# Patient Record
Sex: MALE | Race: OTHER | NOT HISPANIC OR LATINO | URBAN - METROPOLITAN AREA
[De-identification: names, ages, dates, MRNs, and addresses within clinical notes are randomized per-mention and may not be internally consistent; named-entity substitution may affect disease eponyms.]

---

## 2018-02-08 ENCOUNTER — EMERGENCY (EMERGENCY)
Facility: HOSPITAL | Age: 23
LOS: 1 days | Discharge: HOME | End: 2018-02-08
Admitting: EMERGENCY MEDICINE
Payer: COMMERCIAL

## 2018-02-08 VITALS
SYSTOLIC BLOOD PRESSURE: 144 MMHG | OXYGEN SATURATION: 98 % | RESPIRATION RATE: 17 BRPM | DIASTOLIC BLOOD PRESSURE: 81 MMHG | TEMPERATURE: 99 F | HEART RATE: 74 BPM | WEIGHT: 160.06 LBS

## 2018-02-08 DIAGNOSIS — B08.1 MOLLUSCUM CONTAGIOSUM: ICD-10-CM

## 2018-02-08 DIAGNOSIS — L98.9 DISORDER OF THE SKIN AND SUBCUTANEOUS TISSUE, UNSPECIFIED: ICD-10-CM

## 2018-02-08 PROCEDURE — 99282 EMERGENCY DEPT VISIT SF MDM: CPT

## 2018-02-08 NOTE — ED ADULT NURSE NOTE - OBJECTIVE STATEMENT
c.o two small papules to back of head for a "few days". denies any fever/chills. evaluated by andreina cardenas. no distress noted.

## 2018-02-08 NOTE — ED PROVIDER NOTE - OBJECTIVE STATEMENT
23 yo m presents to ed c/o of new lesion that his  noticed on his head when he got a haircut today.  Pt describes it as feeling uncomfortable and that he has never had anything like this before.  Pt denies fevers, chills, weakness, fatigue

## 2018-02-08 NOTE — ED PROVIDER NOTE - MEDICAL DECISION MAKING DETAILS
21 yo m presents to ed c/o of new lesion that his  noticed on his head when he got a haircut today.  On exam lesions most likely c/w molloscum contagiosum, needle aspiration to one of lesions performed no discharge, pt has f/u apt with dermatology on tuesday, which he was advised to go to.  Pt stable for discharge

## 2018-02-08 NOTE — ED ADULT NURSE NOTE - NS ED NURSE LEVEL OF CONSCIOUSNESS SPEECH
Alert-The patient is alert, awake and responds to voice. The patient is oriented to time, place, and person. The triage nurse is able to obtain subjective information. Speaking Coherently

## 2018-02-08 NOTE — ED PROVIDER NOTE - SKIN, MLM
Skin normal color for race, warm, dry and intact. No evidence of rash, a few scattered umbilical lesions to occiput of head, no signs of superimposed infection, no surrounding erythema

## 2018-02-08 NOTE — ED ADULT TRIAGE NOTE - CHIEF COMPLAINT QUOTE
"I noticed a bump on the back of my head after I got a haircut today and it hurts when you touch it." No skin breakdown, falls / injury, no neuro deficits.

## 2020-03-16 ENCOUNTER — EMERGENCY (EMERGENCY)
Facility: HOSPITAL | Age: 25
LOS: 1 days | Discharge: HOME | End: 2020-03-16
Admitting: EMERGENCY MEDICINE
Payer: COMMERCIAL

## 2020-03-16 VITALS
WEIGHT: 149.91 LBS | SYSTOLIC BLOOD PRESSURE: 134 MMHG | RESPIRATION RATE: 18 BRPM | TEMPERATURE: 99 F | OXYGEN SATURATION: 100 % | HEIGHT: 67 IN | HEART RATE: 68 BPM | DIASTOLIC BLOOD PRESSURE: 74 MMHG

## 2020-03-16 LAB — S PYO AG SPEC QL IA: NEGATIVE — SIGNIFICANT CHANGE UP

## 2020-03-16 PROCEDURE — 87081 CULTURE SCREEN ONLY: CPT

## 2020-03-16 PROCEDURE — 99282 EMERGENCY DEPT VISIT SF MDM: CPT

## 2020-03-16 PROCEDURE — 99283 EMERGENCY DEPT VISIT LOW MDM: CPT

## 2020-03-16 PROCEDURE — 87880 STREP A ASSAY W/OPTIC: CPT

## 2020-03-16 NOTE — ED PROVIDER NOTE - PATIENT PORTAL LINK FT
You can access the FollowMyHealth Patient Portal offered by [facility] by registering at the following website: [email or web address]. By joining [employer]’s FollowMyHealth portal, you will also be able to view your health information using other applications (apps) compatible with our system.

## 2020-03-16 NOTE — ED ADULT NURSE NOTE - OBJECTIVE STATEMENT
Pt presents complaining of throat pain that worsens with swallowing. Pt reports no cough, no SOB, no fevers/chills.

## 2020-03-16 NOTE — ED PROVIDER NOTE - OBJECTIVE STATEMENT
25 y/o m with no p mh present to ED c/o three days of sore throat and pain with swallowing. Denies fever, ear pain , nasal congestion, rhinorrhea, cough, sob, chest pain, n, v, abd pain or diarrhea.

## 2020-03-16 NOTE — ED PROVIDER NOTE - NSFOLLOWUPINSTRUCTIONS_ED_ALL_ED_FT
[facility]    Viral Respiratory Infection  A respiratory infection is an illness that affects part of the respiratory system, such as the lungs, nose, or throat. A respiratory infection that is caused by a virus is called a viral respiratory infection.  Common types of viral respiratory infections include:  A cold.The flu (influenza).A respiratory syncytial virus (RSV) infection.What are the causes?  This condition is caused by a virus.  What are the signs or symptoms?  Symptoms of this condition include:  A stuffy or runny nose.Yellow or green nasal discharge.A cough.Sneezing.Fatigue.Achy muscles.A sore throat.Sweating or chills.A fever.A headache.How is this diagnosed?  This condition may be diagnosed based on:  Your symptoms.A physical exam.Testing of nasal swabs.How is this treated?  This condition may be treated with medicines, such as:  Antiviral medicine. This may shorten the length of time a person has symptoms.Expectorants. These make it easier to cough up mucus.Decongestant nasal sprays.Acetaminophen or NSAIDs to relieve fever and pain.Antibiotic medicines are not prescribed for viral infections. This is because antibiotics are designed to kill bacteria. They are not effective against viruses.  Follow these instructions at home:     Managing pain and congestion     Take over-the-counter and prescription medicines only as told by your health care provider.If you have a sore throat, gargle with a salt-water mixture 3–4 times a day or as needed. To make a salt-water mixture, completely dissolve ½–1 tsp of salt in 1 cup of warm water.Use nose drops made from salt water to ease congestion and soften raw skin around your nose.Drink enough fluid to keep your urine pale yellow. This helps prevent dehydration and helps loosen up mucus.General instructions     Rest as much as possible.Do not drink alcohol.Do not use any products that contain nicotine or tobacco, such as cigarettes and e-cigarettes. If you need help quitting, ask your health care provider.Keep all follow-up visits as told by your health care provider. This is important.How is this prevented?     Get an annual flu shot. You may get the flu shot in late summer, fall, or winter. Ask your health care provider when you should get your flu shot.Avoid exposing others to your respiratory infection.  Stay home from work or school as told by your health care provider.Wash your hands with soap and water often, especially after you cough or sneeze. If soap and water are not available, use alcohol-based hand .Avoid contact with people who are sick during cold and flu season. This is generally fall and winter.Contact a health care provider if:  Your symptoms last for 10 days or longer.Your symptoms get worse over time.You have a fever.You have severe sinus pain in your face or forehead.The glands in your jaw or neck become very swollen.Get help right away if you:  Feel pain or pressure in your chest.Have shortness of breath.Faint or feel like you will faint.Have severe and persistent vomiting.Feel confused or disoriented.Summary  A respiratory infection is an illness that affects part of the respiratory system, such as the lungs, nose, or throat. A respiratory infection that is caused by a virus is called a viral respiratory infection.Common types of viral respiratory infections are a cold, influenza, and respiratory syncytial virus (RSV) infection.Symptoms of this condition include a stuffy or runny nose, cough, sneezing, fatigue, achy muscles, sore throat, and fevers or chills.Antibiotic medicines are not prescribed for viral infections. This is because antibiotics are designed to kill bacteria. They are not effective against viruses.This information is not intended to replace advice given to you by your health care provider. Make sure you discuss any questions you have with your health care provider.    Document Released: 09/27/2006 Document Revised: 12/26/2019 Document Reviewed: 01/28/2019  [employer] Interactive Patient Education © 2020 [employer] Viral Respiratory Infection  A respiratory infection is an illness that affects part of the respiratory system, such as the lungs, nose, or throat. A respiratory infection that is caused by a virus is called a viral respiratory infection.  Common types of viral respiratory infections include:  A cold.The flu (influenza).A respiratory syncytial virus (RSV) infection.What are the causes?  This condition is caused by a virus.  What are the signs or symptoms?  Symptoms of this condition include:  A stuffy or runny nose.Yellow or green nasal discharge.A cough.Sneezing.Fatigue.Achy muscles.A sore throat.Sweating or chills.A fever.A headache.How is this diagnosed?  This condition may be diagnosed based on:  Your symptoms.A physical exam.Testing of nasal swabs.How is this treated?  This condition may be treated with medicines, such as:  Antiviral medicine. This may shorten the length of time a person has symptoms.Expectorants. These make it easier to cough up mucus.Decongestant nasal sprays.Acetaminophen or NSAIDs to relieve fever and pain.Antibiotic medicines are not prescribed for viral infections. This is because antibiotics are designed to kill bacteria. They are not effective against viruses.  Follow these instructions at home:     Managing pain and congestion     Take over-the-counter and prescription medicines only as told by your health care provider.If you have a sore throat, gargle with a salt-water mixture 3–4 times a day or as needed. To make a salt-water mixture, completely dissolve ½–1 tsp of salt in 1 cup of warm water.Use nose drops made from salt water to ease congestion and soften raw skin around your nose.Drink enough fluid to keep your urine pale yellow. This helps prevent dehydration and helps loosen up mucus.General instructions     Rest as much as possible.Do not drink alcohol.Do not use any products that contain nicotine or tobacco, such as cigarettes and e-cigarettes. If you need help quitting, ask your health care provider.Keep all follow-up visits as told by your health care provider. This is important.How is this prevented?     Get an annual flu shot. You may get the flu shot in late summer, fall, or winter. Ask your health care provider when you should get your flu shot.Avoid exposing others to your respiratory infection.  Stay home from work or school as told by your health care provider.Wash your hands with soap and water often, especially after you cough or sneeze. If soap and water are not available, use alcohol-based hand .Avoid contact with people who are sick during cold and flu season. This is generally fall and winter.Contact a health care provider if:  Your symptoms last for 10 days or longer.Your symptoms get worse over time.You have a fever.You have severe sinus pain in your face or forehead.The glands in your jaw or neck become very swollen.Get help right away if you:  Feel pain or pressure in your chest.Have shortness of breath.Faint or feel like you will faint.Have severe and persistent vomiting.Feel confused or disoriented.Summary  A respiratory infection is an illness that affects part of the respiratory system, such as the lungs, nose, or throat. A respiratory infection that is caused by a virus is called a viral respiratory infection.Common types of viral respiratory infections are a cold, influenza, and respiratory syncytial virus (RSV) infection.Symptoms of this condition include a stuffy or runny nose, cough, sneezing, fatigue, achy muscles, sore throat, and fevers or chills.Antibiotic medicines are not prescribed for viral infections. This is because antibiotics are designed to kill bacteria. They are not effective against viruses.This information is not intended to replace advice given to you by your health care provider. Make sure you discuss any questions you have with your health care provider.    Document Released: 09/27/2006 Document Revised: 12/26/2019 Document Reviewed: 01/28/2019  [employer] Interactive Patient Education © 2020 [employer]

## 2020-03-16 NOTE — ED PROVIDER NOTE - ENMT, MLM
Airway patent, Nasal mucosa clear. Mouth with normal mucosa. Throat has no vesicles, no oropharyngeal exudates and uvula is midline. mid erythema to posterior pharynx ,

## 2020-03-16 NOTE — ED ADULT NURSE NOTE - CHPI ED NUR SYMPTOMS NEG
no numbness/no vomiting/no fever/no nausea/no loss of consciousness/no bleeding gums/no syncope/no weakness/no chills

## 2020-03-20 DIAGNOSIS — B34.9 VIRAL INFECTION, UNSPECIFIED: ICD-10-CM

## 2020-03-20 DIAGNOSIS — R07.0 PAIN IN THROAT: ICD-10-CM

## 2020-10-30 PROBLEM — Z78.9 OTHER SPECIFIED HEALTH STATUS: Chronic | Status: ACTIVE | Noted: 2020-03-16

## 2020-10-30 NOTE — ED PROVIDER NOTE - SKIN, MLM
JENELLE  GROUP DOCUMENTATION INDIVIDUAL Group Therapy Note Date: 10/30/2020 Group Start Time: 1400 Group End Time: 1500 Group Topic: Recreational/Music Therapy [facility] 3 ACUTE BEHAV [facility], [address] GROUP DOCUMENTATION GROUP Group Therapy Note Attendees: 5 Attendance: Did not attend Patient's Goal: Interventions/techniques Skin normal color for race, warm, dry and intact. No evidence of rash.

## 2020-11-19 ENCOUNTER — APPOINTMENT (OUTPATIENT)
Dept: INTERNAL MEDICINE | Facility: CLINIC | Age: 25
End: 2020-11-19
Payer: COMMERCIAL

## 2020-11-19 VITALS
HEART RATE: 88 BPM | TEMPERATURE: 99 F | WEIGHT: 160 LBS | DIASTOLIC BLOOD PRESSURE: 79 MMHG | OXYGEN SATURATION: 98 % | HEIGHT: 67 IN | SYSTOLIC BLOOD PRESSURE: 129 MMHG | BODY MASS INDEX: 25.11 KG/M2

## 2020-11-19 DIAGNOSIS — Z78.9 OTHER SPECIFIED HEALTH STATUS: ICD-10-CM

## 2020-11-19 DIAGNOSIS — Z83.49 FAMILY HISTORY OF OTHER ENDOCRINE, NUTRITIONAL AND METABOLIC DISEASES: ICD-10-CM

## 2020-11-19 DIAGNOSIS — Z82.49 FAMILY HISTORY OF ISCHEMIC HEART DISEASE AND OTHER DISEASES OF THE CIRCULATORY SYSTEM: ICD-10-CM

## 2020-11-19 DIAGNOSIS — Z11.59 ENCOUNTER FOR SCREENING FOR OTHER VIRAL DISEASES: ICD-10-CM

## 2020-11-19 DIAGNOSIS — Z00.00 ENCOUNTER FOR GENERAL ADULT MEDICAL EXAMINATION W/OUT ABNORMAL FINDINGS: ICD-10-CM

## 2020-11-19 DIAGNOSIS — Z23 ENCOUNTER FOR IMMUNIZATION: ICD-10-CM

## 2020-11-19 DIAGNOSIS — Z11.3 ENCOUNTER FOR SCREENING FOR INFECTIONS WITH A PREDOMINANTLY SEXUAL MODE OF TRANSMISSION: ICD-10-CM

## 2020-11-19 PROCEDURE — 36415 COLL VENOUS BLD VENIPUNCTURE: CPT

## 2020-11-19 PROCEDURE — 90686 IIV4 VACC NO PRSV 0.5 ML IM: CPT

## 2020-11-19 PROCEDURE — 99385 PREV VISIT NEW AGE 18-39: CPT | Mod: 25

## 2020-11-19 PROCEDURE — G0008: CPT

## 2020-11-20 ENCOUNTER — TRANSCRIPTION ENCOUNTER (OUTPATIENT)
Age: 25
End: 2020-11-20

## 2020-11-23 ENCOUNTER — TRANSCRIPTION ENCOUNTER (OUTPATIENT)
Age: 25
End: 2020-11-23

## 2020-11-23 LAB
25(OH)D3 SERPL-MCNC: 24.8 NG/ML
ALBUMIN SERPL ELPH-MCNC: 4.9 G/DL
ALP BLD-CCNC: 61 U/L
ALT SERPL-CCNC: 21 U/L
ANION GAP SERPL CALC-SCNC: 10 MMOL/L
APPEARANCE: CLEAR
AST SERPL-CCNC: 28 U/L
BASOPHILS # BLD AUTO: 0.03 K/UL
BASOPHILS NFR BLD AUTO: 0.5 %
BILIRUB SERPL-MCNC: 0.5 MG/DL
BILIRUBIN URINE: NEGATIVE
BLOOD URINE: NEGATIVE
BUN SERPL-MCNC: 16 MG/DL
C TRACH RRNA SPEC QL NAA+PROBE: NOT DETECTED
CALCIUM SERPL-MCNC: 9.4 MG/DL
CHLORIDE SERPL-SCNC: 101 MMOL/L
CHOLEST SERPL-MCNC: 136 MG/DL
CO2 SERPL-SCNC: 27 MMOL/L
COLOR: YELLOW
CREAT SERPL-MCNC: 1 MG/DL
EOSINOPHIL # BLD AUTO: 0.09 K/UL
EOSINOPHIL NFR BLD AUTO: 1.5 %
ESTIMATED AVERAGE GLUCOSE: 85 MG/DL
GLUCOSE QUALITATIVE U: NEGATIVE
GLUCOSE SERPL-MCNC: 88 MG/DL
HBA1C MFR BLD HPLC: 4.6 %
HCT VFR BLD CALC: 44.5 %
HDLC SERPL-MCNC: 54 MG/DL
HGB BLD-MCNC: 14.4 G/DL
HIV1+2 AB SPEC QL IA.RAPID: NONREACTIVE
IMM GRANULOCYTES NFR BLD AUTO: 0.2 %
KETONES URINE: NEGATIVE
LDLC SERPL CALC-MCNC: 68 MG/DL
LEUKOCYTE ESTERASE URINE: NEGATIVE
LYMPHOCYTES # BLD AUTO: 1.83 K/UL
LYMPHOCYTES NFR BLD AUTO: 30.5 %
MAN DIFF?: NORMAL
MCHC RBC-ENTMCNC: 30.6 PG
MCHC RBC-ENTMCNC: 32.4 GM/DL
MCV RBC AUTO: 94.7 FL
MONOCYTES # BLD AUTO: 0.7 K/UL
MONOCYTES NFR BLD AUTO: 11.7 %
N GONORRHOEA RRNA SPEC QL NAA+PROBE: NOT DETECTED
NEUTROPHILS # BLD AUTO: 3.34 K/UL
NEUTROPHILS NFR BLD AUTO: 55.6 %
NITRITE URINE: NEGATIVE
NONHDLC SERPL-MCNC: 82 MG/DL
PH URINE: 6
PLATELET # BLD AUTO: 210 K/UL
POTASSIUM SERPL-SCNC: 4.5 MMOL/L
PROT SERPL-MCNC: 7.3 G/DL
PROTEIN URINE: NEGATIVE
PSA SERPL-MCNC: 0.94 NG/ML
RBC # BLD: 4.7 M/UL
RBC # FLD: 11.4 %
SARS-COV-2 IGG SERPL IA-ACNC: 0.1 INDEX
SARS-COV-2 IGG SERPL QL IA: NEGATIVE
SODIUM SERPL-SCNC: 138 MMOL/L
SOURCE AMPLIFICATION: NORMAL
SPECIFIC GRAVITY URINE: 1.03
T PALLIDUM AB SER QL IA: NEGATIVE
T4 FREE SERPL-MCNC: 1.2 NG/DL
TRIGL SERPL-MCNC: 71 MG/DL
TSH SERPL-ACNC: 1.28 UIU/ML
UROBILINOGEN URINE: NORMAL
WBC # FLD AUTO: 6 K/UL

## 2020-11-23 NOTE — HISTORY OF PRESENT ILLNESS
[FreeTextEntry1] : annual exam/est care [de-identified] : annual\par - in overall good health w/o specific complaints

## 2021-11-20 ENCOUNTER — EMERGENCY (EMERGENCY)
Facility: HOSPITAL | Age: 26
LOS: 1 days | Discharge: HOME | End: 2021-11-20
Attending: EMERGENCY MEDICINE | Admitting: EMERGENCY MEDICINE
Payer: COMMERCIAL

## 2021-11-20 VITALS
DIASTOLIC BLOOD PRESSURE: 84 MMHG | HEART RATE: 83 BPM | HEIGHT: 67 IN | TEMPERATURE: 98 F | SYSTOLIC BLOOD PRESSURE: 144 MMHG | RESPIRATION RATE: 16 BRPM | OXYGEN SATURATION: 100 % | WEIGHT: 153 LBS
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
## 2021-11-20 DIAGNOSIS — M79.662 PAIN IN LEFT LOWER LEG: ICD-10-CM

## 2021-11-20 DIAGNOSIS — S80.812A ABRASION, LEFT LOWER LEG, INITIAL ENCOUNTER: ICD-10-CM
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
## 2021-11-20 PROCEDURE — 73590 X-RAY EXAM OF LOWER LEG: CPT

## 2021-11-20 PROCEDURE — 99283 EMERGENCY DEPT VISIT LOW MDM: CPT | Mod: 25

## 2021-11-20 PROCEDURE — 73590 X-RAY EXAM OF LOWER LEG: CPT | Mod: 26

## 2021-11-20 PROCEDURE — 73590 X-RAY EXAM OF LOWER LEG: CPT | Mod: 26,LT

## 2021-11-20 NOTE — ED ADULT TRIAGE NOTE - CHIEF COMPLAINT QUOTE
Pt co L shin pain after injury at work- luggage hit int shin. Pt has small abrasion and erythema to shin. Ambulatory with steady gait. tetanus is UTD.

## 2021-11-20 NOTE — ED PROVIDER NOTE - CARE PLAN
Principal Discharge DX:	Pain in shin, left   1 Principal Discharge DX:	Pain in shin, left  Secondary Diagnosis:	Abrasion

## 2021-11-20 NOTE — ED PROVIDER NOTE - OBJECTIVE STATEMENT
26M with no sig PMHx who p/w left shin pain after a piece of heavy luggage hit his shin while he was at work PTA, he has a small subcm abrasion to shin that he cleaned out and covered with a band aid, no active bleeding, tdap UTD. pt denies other trauma or injury. he has been ambulatory. no other complaints.

## 2021-11-20 NOTE — ED PROVIDER NOTE - PHYSICAL EXAMINATION
GEN: Well appearing, well developed, awake, alert, oriented to person, place, time/situation and in no apparent distress. NTAF  ENT: Airway patent, Nasal mucosa clear. Mouth with normal mucosa.  EYES: Clear bilaterally. PERRL, EOMI  RESPIRATORY: Breathing comfortably with normal RR.  CARDIAC: Regular rate and rhythm, no M/R/G  ABDOMEN: Soft, nontender, +bowel sounds, no rebound, rigidity, or guarding.  MSK: Range of motion is not limited, no deformities noted. +TTP to left shin  NEURO: Alert and oriented, no focal deficits.  SKIN: Skin normal color for race, warm, dry, subcm abrasion to left shin, no active bleeding. No evidence of rash.  PSYCH: Alert and oriented to person, place, time/situation. normal mood and affect. no apparent risk to self or others.

## 2021-11-20 NOTE — ED PROVIDER NOTE - NSFOLLOWUPINSTRUCTIONS_ED_ALL_ED_FT
Please follow up with your primary care physician. You may call our referrals coordinator at [phone] Monday to Friday 11am-7pm for assistance with making an appointment.  Return to the Emergency Department if you have any new or worsening symptoms, or for any other concerns. Please read below for further information.      Abrasion      An abrasion is a cut or a scrape on your skin. You must take care of your wound so germs do not get in it and cause infection.      What are the causes?    This condition is caused by rubbing your skin on something or falling on a surface, such as the ground. When your skin rubs on something, some layers of skin may rub off.      What are the signs or symptoms?    •A cut or a scrape.       •Bleeding.      •A red or pink spot.      •A bruise under your wound.        How is this treated?  This condition may be treated with:  •Cleaning your wound.      •Putting ointment on your wound.      •Putting a bandage on your wound.      •Getting a tetanus shot.        Follow these instructions at home:    Your doctor may tell you to do these things:    Medicines     •Take or use over-the-counter and prescription medicines only as told by your doctor.      •If you were prescribed an antibiotic medicine, use it as told by your doctor. Do not stop using it even if you start to feel better.      Keep your wound clean   •Clean your wound 1 or 2 times a day or as often told by your doctor. To do this:  1.Wash your hands for at least 20 seconds with mild soap and water. Do this before and after you clean your wound.      2.Wash your wound with mild soap and water.      3.Rinse off the soap.      4.Pat your wound with a clean towel to dry it. Do not rub your wound.      •Keep your bandage clean and dry. Take it off and change it as told by your doctor.  •You may have to change your bandage one or more times a day, or as told by your doctor.        Watch for signs of infection   Check your wound every day for signs of infection. Check for:  •A red streak that goes away from your wound.      •Other redness.      •Swelling or more pain.      •Warmth.       •Blood, fluid, pus, or a bad smell.        Treat pain and swelling  •If told, put ice on the injured area. To do this:  •Put ice in a plastic bag.       •Place a towel between your skin and the bag.       •Leave the ice on for 20 minutes, 2–3 times a day.      •Take off the ice if your skin turns bright red. This is very important. If you cannot feel pain, heat, or cold, you have a greater risk of damage to the area.        •If you can, raise the injured area above the level of your heart while you are sitting or lying down.      General instructions    •Do not take baths, swim, or use a hot tub. Ask your doctor about taking showers or sponge baths.      •Keep all follow-up visits.        Contact a doctor if:  •You had a tetanus shot, and you have any of these where the needle went in:  •Swelling.      •Very bad pain.      •Redness.      •Bleeding.        •You have a lot of pain, and medicine does not help.      •You have a fever.    •You have any of these signs of infection in your wound:  •Redness, swelling, or more pain.      •Blood, fluid, pus, or a bad smell.      •Warmth.          Get help right away if:    •You have a red streak going away from your wound.        Summary    •An abrasion is a cut or a scrape on your skin. Take care of your wound so germs do not get in it.      •Clean your wound 1 or 2 times a day or as often as told. Change your bandage as told and use medicines as told.      •Call your doctor if you have a fever or if you have redness, swelling, or more pain in your wound.      •Call your doctor if you have blood, fluid, pus, or a bad smell in your wound.      •Get help right away if you have a red streak going away from your wound.      This information is not intended to replace advice given to you by your health care provider. Make sure you discuss any questions you have with your health care provider.

## 2021-11-20 NOTE — ED PROVIDER NOTE - PATIENT PORTAL LINK FT
You can access the FollowMyHealth Patient Portal offered by [facility] by registering at the following website: [email or web address]. By joining [employer]’s FollowMyHealth portal, you will also be able to view your health information using other applications (apps) compatible with our system.

## 2021-11-20 NOTE — ED PROVIDER NOTE - CLINICAL SUMMARY MEDICAL DECISION MAKING FREE TEXT BOX
26M with no sig PMHx who p/w left shin pain after a piece of heavy luggage hit his shin while he was at work PTA, he has a small subcm abrasion to shin that he cleaned out and covered with a band aid, no active bleeding, tdap UTD. pt denies other trauma or injury. he has been ambulatory. no other complaints.   Pt is well-appearing on exam, VSS, no focal neuro deficits, Distal ext NVI with +2 pulses, compartments soft, no ligamentous instability, small abrasion to left shin, no active bleeding.  Plan for Xray to r/o fracture, pain control prn, wound care instructions given.

## 2022-01-06 ENCOUNTER — EMERGENCY (EMERGENCY)
Facility: HOSPITAL | Age: 27
LOS: 1 days | Discharge: HOME | End: 2022-01-06
Admitting: EMERGENCY MEDICINE
Payer: COMMERCIAL

## 2022-01-06 VITALS
DIASTOLIC BLOOD PRESSURE: 72 MMHG | HEIGHT: 67 IN | TEMPERATURE: 98 F | RESPIRATION RATE: 18 BRPM | HEART RATE: 75 BPM | WEIGHT: 149.91 LBS | SYSTOLIC BLOOD PRESSURE: 122 MMHG | OXYGEN SATURATION: 100 %

## 2022-01-06 VITALS
RESPIRATION RATE: 19 BRPM | HEART RATE: 71 BPM | OXYGEN SATURATION: 100 % | TEMPERATURE: 98 F | SYSTOLIC BLOOD PRESSURE: 118 MMHG | DIASTOLIC BLOOD PRESSURE: 70 MMHG

## 2022-01-06 DIAGNOSIS — S90.812A ABRASION, LEFT FOOT, INITIAL ENCOUNTER: ICD-10-CM

## 2022-01-06 DIAGNOSIS — Z23 ENCOUNTER FOR IMMUNIZATION: ICD-10-CM
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
## 2022-01-06 DIAGNOSIS — S91.332A PUNCTURE WOUND WITHOUT FOREIGN BODY, LEFT FOOT, INITIAL ENCOUNTER: ICD-10-CM
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
## 2022-01-06 PROCEDURE — 99283 EMERGENCY DEPT VISIT LOW MDM: CPT | Mod: 25

## 2022-01-06 PROCEDURE — 90471 IMMUNIZATION ADMIN: CPT

## 2022-01-06 PROCEDURE — 99283 EMERGENCY DEPT VISIT LOW MDM: CPT

## 2022-01-06 PROCEDURE — 90715 TDAP VACCINE 7 YRS/> IM: CPT

## 2022-01-06 RX ORDER — CEPHALEXIN 500 MG
1 CAPSULE ORAL
Qty: 28 | Refills: 0
Start: 2022-01-06 | End: 2022-01-12

## 2022-01-06 RX ORDER — TETANUS TOXOID, REDUCED DIPHTHERIA TOXOID AND ACELLULAR PERTUSSIS VACCINE, ADSORBED 5; 2.5; 8; 8; 2.5 [IU]/.5ML; [IU]/.5ML; UG/.5ML; UG/.5ML; UG/.5ML
0.5 SUSPENSION INTRAMUSCULAR ONCE
Refills: 0 | Status: COMPLETED | OUTPATIENT
Start: 2022-01-06 | End: 2022-01-06

## 2022-01-06 RX ADMIN — TETANUS TOXOID, REDUCED DIPHTHERIA TOXOID AND ACELLULAR PERTUSSIS VACCINE, ADSORBED 0.5 MILLILITER(S): 5; 2.5; 8; 8; 2.5 SUSPENSION INTRAMUSCULAR at 07:36

## 2022-01-06 NOTE — ED ADULT TRIAGE NOTE - CHIEF COMPLAINT QUOTE
punctured wound left foot, pt accidentally stepped on a anusha nail @ an hour ago punctured wound left foot, pt accidentally stepped on a anusha nail @ an hour ago, last tetanus shots more than 10 years.

## 2022-01-06 NOTE — ED ADULT NURSE NOTE - CHIEF COMPLAINT QUOTE
punctured wound left foot, pt accidentally stepped on a anusha nail @ an hour ago, last tetanus shots more than 10 years.

## 2022-01-06 NOTE — ED ADULT NURSE NOTE - NSIMPLEMENTINTERV_GEN_ALL_ED
Implemented All Universal Safety Interventions:  [place] to call system. Call bell, personal items and telephone within reach. Instruct patient to call for assistance. Room bathroom lighting operational. Non-slip footwear when patient is off stretcher. Physically safe environment: no spills, clutter or unnecessary equipment. Stretcher in lowest position, wheels locked, appropriate side rails in place.

## 2022-01-06 NOTE — ED PROVIDER NOTE - PATIENT PORTAL LINK FT
You can access the FollowMyHealth Patient Portal offered by [facility] by registering at the following website: [email or web address]. By joining [facility]’s FollowMyHealth portal, you will also be able to view your health information using other applications (apps) compatible with our system.

## 2022-01-06 NOTE — ED PROVIDER NOTE - SKIN, MLM
[facility]  Dr. Benson  [phone]    WOUND CARE INSTRUCTIONS      Begin in 48 hours    1. Cleanse wound gently with tap water and Q-tip once daily.  If there is drainage, remove as much as possible.   2. Apply Vaseline-do not let the wound dry out.  Reapply Vaseline as needed.    3.  Cover the wound with a clean Band-Aid or non-stick dressing, unless instructed differently.    4.  If there is any oozing or bleeding, apply firm pressure for 5 minutes or more.      You may shower, however reapply vaseline and bandaid.          Please call with any questions.                   Skin normal color for race, warm, dry and intact. No evidence of rash.

## 2022-01-06 NOTE — ED PROVIDER NOTE - CLINICAL SUMMARY MEDICAL DECISION MAKING FREE TEXT BOX
pt sustained a superficial abrasion to L foot - cut on a nail at home while barefoot, last Td? no fb/no tend, no lac to suture, td updated, wound cleaned and dressed, will dc w/abx, wound care discussed, pt understands and agrees w/plan, strict return precautions given.

## 2022-01-06 NOTE — ED ADULT NURSE NOTE - OBJECTIVE STATEMENT
26 y M, states he was walking and felt pain in his right foot, pt states he pulled 1 anusha nail out of his foot, foot at this time non bloody, no drainage, able to ambulate and walk with mild difficulty but able to bear full weight. Pt states he does not remember when last tetanus shot was but believes over 10 years ago.

## 2022-01-06 NOTE — ED PROVIDER NOTE - OBJECTIVE STATEMENT
The pt is a 27 y/o M, who presents to ED c/o cut to bottom of foot - cut on a nail at home (was barefoot). Pt states some bleeding, last Td? Denies numbness or tingling to toes, decreased ROM, FB sensation, any other c/o.

## 2022-01-06 NOTE — ED PROVIDER NOTE - NSFOLLOWUPINSTRUCTIONS_ED_ALL_ED_FT
KEEP CLEAN AND DRY, WASH WITH WATER AND SOAP, APPLY BACITRACIN AND COVER WITH DRESSING, TAKE ANTIBIOTICS, TYLENOL OR IBUPROFEN AS NEEDED FOR PAIN, ELEVATE FOOT IF THROBS, RETURN FOR REDNESS, PUS, BLEEDING, PAIN.   Abrasion  WHAT YOU NEED TO KNOW:  An abrasion is a wound on your skin. Abrasions usually happen when your skin rubs against a rough surface. Examples of an abrasion include rug burn, a skinned elbow, or road rash. Abrasions can be deep or shallow The wound may hurt, bleed, bruise, or swell.  DISCHARGE INSTRUCTIONS:  Return to the emergency department if:   •The bleeding does not stop after 10 minutes of firm pressure.  •The redness around your wound begins to spread.  •You cannot rinse one or more foreign objects out of your wound.  Call your doctor if:   •You have a fever or chills.   •Your abrasion is red, warm, swollen, or draining pus.  •You have questions or concerns about your condition or care.  Care for your abrasion:   •Wash your hands and dry them with a clean towel before first.  •Press a clean cloth against your wound for 5 to 10 minutes to stop any bleeding.  •Rinse your wound with clean water. Do not use harsh soap, alcohol, or iodine solutions.  •Use a clean, wet cloth to remove any objects, such as small pieces of rocks or dirt.  •Rub antibiotic ointment on your wound. This may help prevent infection and help your wound heal.  •Cover the wound with a non-stick bandage. Change the bandage daily, and if it gets wet or dirty.

## 2022-01-06 NOTE — ED PROVIDER NOTE - MUSCULOSKELETAL, MLM
Spine appears normal, range of motion is not limited, no muscle or joint tenderness; L foot: a very superficial, flap abrasion, ~2mm to plantar aspect of 5th metatarsal, no active bleeding, no FB, no swelling, no pus, FROM, no bony tend, + light touch

## 2022-04-26 NOTE — ED ADULT NURSE NOTE - TEMPLATE
General Propranolol Pregnancy And Lactation Text: This medication is Pregnancy Category C and it isn't known if it is safe during pregnancy. It is excreted in breast milk.

## 2023-09-24 ENCOUNTER — TRANSCRIPTION ENCOUNTER (OUTPATIENT)
Age: 28
End: 2023-09-24

## 2023-09-25 ENCOUNTER — INPATIENT (INPATIENT)
Facility: HOSPITAL | Age: 28
LOS: 0 days | Discharge: HOME | DRG: 343 | End: 2023-09-26
Attending: STUDENT IN AN ORGANIZED HEALTH CARE EDUCATION/TRAINING PROGRAM | Admitting: STUDENT IN AN ORGANIZED HEALTH CARE EDUCATION/TRAINING PROGRAM
Payer: MEDICAID

## 2023-09-25 ENCOUNTER — RESULT REVIEW (OUTPATIENT)
Age: 28
End: 2023-09-25

## 2023-09-25 ENCOUNTER — TRANSCRIPTION ENCOUNTER (OUTPATIENT)
Age: 28
End: 2023-09-25

## 2023-09-25 VITALS
HEART RATE: 86 BPM | WEIGHT: 154.98 LBS | DIASTOLIC BLOOD PRESSURE: 76 MMHG | HEIGHT: 67 IN | SYSTOLIC BLOOD PRESSURE: 146 MMHG | TEMPERATURE: 100 F | RESPIRATION RATE: 16 BRPM | OXYGEN SATURATION: 96 %

## 2023-09-25 LAB
ALBUMIN SERPL ELPH-MCNC: 4.6 G/DL — SIGNIFICANT CHANGE UP (ref 3.3–5)
ALP SERPL-CCNC: 60 U/L — SIGNIFICANT CHANGE UP (ref 40–120)
ALT FLD-CCNC: 14 U/L — SIGNIFICANT CHANGE UP (ref 10–45)
ANION GAP SERPL CALC-SCNC: 8 MMOL/L — SIGNIFICANT CHANGE UP (ref 5–17)
APPEARANCE UR: CLEAR — SIGNIFICANT CHANGE UP
APTT BLD: 30.4 SEC — SIGNIFICANT CHANGE UP (ref 24.5–35.6)
AST SERPL-CCNC: 19 U/L — SIGNIFICANT CHANGE UP (ref 10–40)
BASOPHILS # BLD AUTO: 0.04 K/UL — SIGNIFICANT CHANGE UP (ref 0–0.2)
BASOPHILS NFR BLD AUTO: 0.4 % — SIGNIFICANT CHANGE UP (ref 0–2)
BILIRUB SERPL-MCNC: 0.8 MG/DL — SIGNIFICANT CHANGE UP (ref 0.2–1.2)
BILIRUB UR-MCNC: NEGATIVE — SIGNIFICANT CHANGE UP
BUN SERPL-MCNC: 11 MG/DL — SIGNIFICANT CHANGE UP (ref 7–23)
CALCIUM SERPL-MCNC: 9.1 MG/DL — SIGNIFICANT CHANGE UP (ref 8.4–10.5)
CHLORIDE SERPL-SCNC: 100 MMOL/L — SIGNIFICANT CHANGE UP (ref 96–108)
CO2 SERPL-SCNC: 30 MMOL/L — SIGNIFICANT CHANGE UP (ref 22–31)
COLOR SPEC: YELLOW — SIGNIFICANT CHANGE UP
CREAT SERPL-MCNC: 1.04 MG/DL — SIGNIFICANT CHANGE UP (ref 0.5–1.3)
DIFF PNL FLD: NEGATIVE — SIGNIFICANT CHANGE UP
EGFR: 101 ML/MIN/1.73M2 — SIGNIFICANT CHANGE UP
EOSINOPHIL # BLD AUTO: 0.05 K/UL — SIGNIFICANT CHANGE UP (ref 0–0.5)
EOSINOPHIL NFR BLD AUTO: 0.5 % — SIGNIFICANT CHANGE UP (ref 0–6)
GLUCOSE SERPL-MCNC: 98 MG/DL — SIGNIFICANT CHANGE UP (ref 70–99)
GLUCOSE UR QL: NEGATIVE — SIGNIFICANT CHANGE UP
HCT VFR BLD CALC: 45 % — SIGNIFICANT CHANGE UP (ref 39–50)
HGB BLD-MCNC: 15.1 G/DL — SIGNIFICANT CHANGE UP (ref 13–17)
IMM GRANULOCYTES NFR BLD AUTO: 0.2 % — SIGNIFICANT CHANGE UP (ref 0–0.9)
INR BLD: 1.34 — HIGH (ref 0.85–1.18)
KETONES UR-MCNC: NEGATIVE — SIGNIFICANT CHANGE UP
LEUKOCYTE ESTERASE UR-ACNC: NEGATIVE — SIGNIFICANT CHANGE UP
LIDOCAIN IGE QN: 26 U/L — SIGNIFICANT CHANGE UP (ref 7–60)
LYMPHOCYTES # BLD AUTO: 1.9 K/UL — SIGNIFICANT CHANGE UP (ref 1–3.3)
LYMPHOCYTES # BLD AUTO: 17.8 % — SIGNIFICANT CHANGE UP (ref 13–44)
MCHC RBC-ENTMCNC: 30.7 PG — SIGNIFICANT CHANGE UP (ref 27–34)
MCHC RBC-ENTMCNC: 33.6 GM/DL — SIGNIFICANT CHANGE UP (ref 32–36)
MCV RBC AUTO: 91.5 FL — SIGNIFICANT CHANGE UP (ref 80–100)
MONOCYTES # BLD AUTO: 1.05 K/UL — HIGH (ref 0–0.9)
MONOCYTES NFR BLD AUTO: 9.9 % — SIGNIFICANT CHANGE UP (ref 2–14)
NEUTROPHILS # BLD AUTO: 7.59 K/UL — HIGH (ref 1.8–7.4)
NEUTROPHILS NFR BLD AUTO: 71.2 % — SIGNIFICANT CHANGE UP (ref 43–77)
NITRITE UR-MCNC: NEGATIVE — SIGNIFICANT CHANGE UP
NRBC # BLD: 0 /100 WBCS — SIGNIFICANT CHANGE UP (ref 0–0)
PH UR: 6 — SIGNIFICANT CHANGE UP (ref 5–8)
PLATELET # BLD AUTO: 192 K/UL — SIGNIFICANT CHANGE UP (ref 150–400)
POTASSIUM SERPL-MCNC: 4.5 MMOL/L — SIGNIFICANT CHANGE UP (ref 3.5–5.3)
POTASSIUM SERPL-SCNC: 4.5 MMOL/L — SIGNIFICANT CHANGE UP (ref 3.5–5.3)
PROT SERPL-MCNC: 7.6 G/DL — SIGNIFICANT CHANGE UP (ref 6–8.3)
PROT UR-MCNC: NEGATIVE MG/DL — SIGNIFICANT CHANGE UP
PROTHROM AB SERPL-ACNC: 15.1 SEC — HIGH (ref 9.5–13)
RBC # BLD: 4.92 M/UL — SIGNIFICANT CHANGE UP (ref 4.2–5.8)
RBC # FLD: 11.9 % — SIGNIFICANT CHANGE UP (ref 10.3–14.5)
SODIUM SERPL-SCNC: 138 MMOL/L — SIGNIFICANT CHANGE UP (ref 135–145)
SP GR SPEC: 1.01 — SIGNIFICANT CHANGE UP (ref 1–1.03)
UROBILINOGEN FLD QL: 0.2 E.U./DL — SIGNIFICANT CHANGE UP
WBC # BLD: 10.65 K/UL — HIGH (ref 3.8–10.5)
WBC # FLD AUTO: 10.65 K/UL — HIGH (ref 3.8–10.5)

## 2023-09-25 PROCEDURE — 88304 TISSUE EXAM BY PATHOLOGIST: CPT | Mod: 26

## 2023-09-25 PROCEDURE — 74177 CT ABD & PELVIS W/CONTRAST: CPT | Mod: 26,MG

## 2023-09-25 PROCEDURE — 44970 LAPAROSCOPY APPENDECTOMY: CPT

## 2023-09-25 PROCEDURE — G1004: CPT

## 2023-09-25 PROCEDURE — 99285 EMERGENCY DEPT VISIT HI MDM: CPT

## 2023-09-25 PROCEDURE — 99223 1ST HOSP IP/OBS HIGH 75: CPT | Mod: 57

## 2023-09-25 DEVICE — CLIP APPLIER ETHICON LIGAMAX 5MM: Type: IMPLANTABLE DEVICE | Status: FUNCTIONAL

## 2023-09-25 DEVICE — STAPLER COVIDIEN TRI-STAPLE 45MM PURPLE RELOAD: Type: IMPLANTABLE DEVICE | Status: FUNCTIONAL

## 2023-09-25 RX ORDER — HEPARIN SODIUM 5000 [USP'U]/ML
5000 INJECTION INTRAVENOUS; SUBCUTANEOUS EVERY 8 HOURS
Refills: 0 | Status: DISCONTINUED | OUTPATIENT
Start: 2023-09-25 | End: 2023-09-26

## 2023-09-25 RX ORDER — IOHEXOL 300 MG/ML
30 INJECTION, SOLUTION INTRAVENOUS ONCE
Refills: 0 | Status: COMPLETED | OUTPATIENT
Start: 2023-09-25 | End: 2023-09-25

## 2023-09-25 RX ORDER — SODIUM CHLORIDE 9 MG/ML
1000 INJECTION INTRAMUSCULAR; INTRAVENOUS; SUBCUTANEOUS ONCE
Refills: 0 | Status: COMPLETED | OUTPATIENT
Start: 2023-09-25 | End: 2023-09-25

## 2023-09-25 RX ORDER — HYDROMORPHONE HYDROCHLORIDE 2 MG/ML
0.5 INJECTION INTRAMUSCULAR; INTRAVENOUS; SUBCUTANEOUS EVERY 6 HOURS
Refills: 0 | Status: DISCONTINUED | OUTPATIENT
Start: 2023-09-25 | End: 2023-09-26

## 2023-09-25 RX ORDER — METRONIDAZOLE 500 MG
500 TABLET ORAL EVERY 8 HOURS
Refills: 0 | Status: DISCONTINUED | OUTPATIENT
Start: 2023-09-26 | End: 2023-09-25

## 2023-09-25 RX ORDER — OXYCODONE HYDROCHLORIDE 5 MG/1
5 TABLET ORAL EVERY 6 HOURS
Refills: 0 | Status: DISCONTINUED | OUTPATIENT
Start: 2023-09-25 | End: 2023-09-26

## 2023-09-25 RX ORDER — ONDANSETRON 8 MG/1
4 TABLET, FILM COATED ORAL EVERY 6 HOURS
Refills: 0 | Status: DISCONTINUED | OUTPATIENT
Start: 2023-09-25 | End: 2023-09-26

## 2023-09-25 RX ORDER — CEFTRIAXONE 500 MG/1
1000 INJECTION, POWDER, FOR SOLUTION INTRAMUSCULAR; INTRAVENOUS ONCE
Refills: 0 | Status: COMPLETED | OUTPATIENT
Start: 2023-09-25 | End: 2023-09-25

## 2023-09-25 RX ORDER — SODIUM CHLORIDE 9 MG/ML
1000 INJECTION, SOLUTION INTRAVENOUS
Refills: 0 | Status: DISCONTINUED | OUTPATIENT
Start: 2023-09-25 | End: 2023-09-25

## 2023-09-25 RX ORDER — ACETAMINOPHEN 500 MG
650 TABLET ORAL EVERY 6 HOURS
Refills: 0 | Status: DISCONTINUED | OUTPATIENT
Start: 2023-09-25 | End: 2023-09-26

## 2023-09-25 RX ORDER — HYDROMORPHONE HYDROCHLORIDE 2 MG/ML
0.5 INJECTION INTRAMUSCULAR; INTRAVENOUS; SUBCUTANEOUS EVERY 4 HOURS
Refills: 0 | Status: DISCONTINUED | OUTPATIENT
Start: 2023-09-25 | End: 2023-09-25

## 2023-09-25 RX ORDER — METRONIDAZOLE 500 MG
500 TABLET ORAL ONCE
Refills: 0 | Status: COMPLETED | OUTPATIENT
Start: 2023-09-25 | End: 2023-09-25

## 2023-09-25 RX ORDER — CEFTRIAXONE 500 MG/1
1000 INJECTION, POWDER, FOR SOLUTION INTRAMUSCULAR; INTRAVENOUS EVERY 24 HOURS
Refills: 0 | Status: DISCONTINUED | OUTPATIENT
Start: 2023-09-26 | End: 2023-09-25

## 2023-09-25 RX ADMIN — SODIUM CHLORIDE 1000 MILLILITER(S): 9 INJECTION INTRAMUSCULAR; INTRAVENOUS; SUBCUTANEOUS at 15:24

## 2023-09-25 RX ADMIN — Medication 650 MILLIGRAM(S): at 23:42

## 2023-09-25 RX ADMIN — CEFTRIAXONE 100 MILLIGRAM(S): 500 INJECTION, POWDER, FOR SOLUTION INTRAMUSCULAR; INTRAVENOUS at 17:24

## 2023-09-25 RX ADMIN — Medication 100 MILLIGRAM(S): at 17:43

## 2023-09-25 RX ADMIN — IOHEXOL 30 MILLILITER(S): 300 INJECTION, SOLUTION INTRAVENOUS at 15:24

## 2023-09-25 RX ADMIN — HEPARIN SODIUM 5000 UNIT(S): 5000 INJECTION INTRAVENOUS; SUBCUTANEOUS at 22:42

## 2023-09-25 RX ADMIN — Medication 650 MILLIGRAM(S): at 22:42

## 2023-09-25 NOTE — ED PROVIDER NOTE - CLINICAL SUMMARY MEDICAL DECISION MAKING FREE TEXT BOX
26 y/o m presents c/o RLQ abd pain x 1 day.  Oral temp in , other vitals unremarkable, pt with RLQ tenderness on exam, declined pain meds.  Labs with wbc 10.8, CT a/p shows acute appendicitis.  Pt given ceftriaxone and flagyl in ED, consulted surgery who will admit, plan for OR

## 2023-09-25 NOTE — H&P ADULT - NSHPLABSRESULTS_GEN_ALL_CORE
ABS:                        15.1   10.65 )-----------( 192      ( 25 Sep 2023 15:30 )             45.0     09-25    138  |  100  |  11  ----------------------------<  98  4.5   |  30  |  1.04    Ca    9.1      25 Sep 2023 15:30    TPro  7.6  /  Alb  4.6  /  TBili  0.8  /  DBili  x   /  AST  19  /  ALT  14  /  AlkPhos  60  09-25    PT/INR - ( 25 Sep 2023 15:30 )   PT: 15.1 sec;   INR: 1.34          PTT - ( 25 Sep 2023 15:30 )  PTT:30.4 sec

## 2023-09-25 NOTE — PATIENT PROFILE ADULT - FUNCTIONAL ASSESSMENT - DAILY ACTIVITY 5.
Pt has 5 steps c B rails, far apart, to get into house and one flight of stairs, c R rail to go up, to negotiate at home. Pt has a walker and a cane at home but didn't like to use them prior to admission. Pt has a 24/7 home health aide, private paid, for his wife.
3 = A little assistance

## 2023-09-25 NOTE — ED PROVIDER NOTE - OBJECTIVE STATEMENT
28 y/o m no pmh presents c/o RLQ abd pain since last night.  Pt reports nausea, no vomiting, felt chills last night.  Pt denies urinary sx, diarrhea, all other ROS negative.

## 2023-09-25 NOTE — ED PROVIDER NOTE - NS ED ATTENDING STATEMENT MOD
This was a shared visit with the BETTINA. I reviewed and verified the documentation and independently performed the documented:

## 2023-09-25 NOTE — H&P ADULT - HISTORY OF PRESENT ILLNESS
28yo Male pt with no PMH, no surgical history, presenting with sharp RLQ pain that started soddenly last night, associated to subjective fevers, chills and nausea, no vomiting.     In the ED, pt afebrile, nontachycardic, normotensive, and satting on RA. On exam, tender in RLQ. Labs significant for WBC 10.56 with left shift . CTAP showing appendicitis with appendicolith.     PMH: denies  PSHx: denies  Medications:None  Allergies: NKDA  Social Hx: Denies smoking, alcohol, drugs   Family Hx: Denies family hx of IBS, Crohn's, UC, or colon cancer.

## 2023-09-25 NOTE — ED PROVIDER NOTE - ATTENDING APP SHARED VISIT CONTRIBUTION OF CARE
Presenting with RLQ ttp- found to have acute appendicitis. Given IV antibx. Admit to general surgery.

## 2023-09-25 NOTE — PATIENT PROFILE ADULT - SURGICAL SITE DESCRIPTION
3 dermabonds on abdomen intact, clean and dry, no hematoma, no inflammation, no drainage, no bleeding

## 2023-09-25 NOTE — ED ADULT NURSE NOTE - CHIEF COMPLAINT QUOTE
Pt presents to ED here for RLQ pain over 223 hours w/ nausea. Denies sob, cp at present. No known PMHx. Neuro intact and ambulatory.

## 2023-09-25 NOTE — H&P ADULT - NSHPPHYSICALEXAM_GEN_ALL_CORE
T(C): 37.8 (09-25-23 @ 14:39), Max: 37.8 (09-25-23 @ 14:39)  HR: 86 (09-25-23 @ 14:39) (86 - 86)  BP: 146/76 (09-25-23 @ 14:39) (146/76 - 146/76)  RR: 16 (09-25-23 @ 14:39) (16 - 16)  SpO2: 96% (09-25-23 @ 14:39) (96% - 96%)    Physical Exam  General: AAOx3, NAD, laying comfortably in bed  Cardio: S1,S2, No MRG  Pulm: Nonlabored breathing  Abdomen: soft, tender in RLQ, voluntary guarding   Extremities: WWP, peripheral pulses appreciated

## 2023-09-25 NOTE — H&P ADULT - ATTENDING COMMENTS
Patient is a 27 year old gentleman who presents with clinical, laboratory, and radiographic findings consistent with acute appendicitis. He has one day history of sharp and sudden on set right lower quadrant pain with nausea. At time of evaluation afebrile, hemodynamically stable, abdomen soft, tender to palpation in RLQ, nondistended, no rebound or guarding. No previous abdominal surgeries. WBC 11, CT imaging demonstrates significantly thickened and dilated appendix 17 mm with appendicolith. Plan for bowel rest, IVF, IV antibiotics, OR for laparoscopic appendectomy for acute appendicitis.

## 2023-09-25 NOTE — ED ADULT TRIAGE NOTE - CHIEF COMPLAINT QUOTE
Pt presents to ED here for RLQ pain over 223 hours w/ nausea. Denies sob, cp at present. No known PMHx. Neuro intact and ambulatory. Pt presents to ED here for RLQ pain over 22 hours w/ nausea. Denies sob, cp at present. No known PMHx. Neuro intact and ambulatory.

## 2023-09-25 NOTE — PRE-ANESTHESIA EVALUATION ADULT - NSANTHADDINFOFT_GEN_ALL_CORE
H&P Adult [Charted Location: [facility]] [Authored: 25-Sep-2023 18:24]- for Visit: [id number], Complete, Revised, Signed in Full, Available to Patient    History and Physical:   Source of Information	Patient       Patient Identity:  · Birth Sex	Male  · Patient's Sexual Orientation	Heterosexual  · Patient's Gender Identity	Male  · Patient's Preferred Pronoun	Him/He     History of Present Illness:  Reason for Admission: Appendicitis with appendicolith  History of Present Illness:   28yo Male pt with no PMH, no surgical history, presenting with sharp RLQ pain that started soddenly last night, associated to subjective fevers, chills and nausea, no vomiting.     In the ED, pt afebrile, nontachycardic, normotensive, and satting on RA. On exam, tender in RLQ. Labs significant for WBC 10.56 with left shift . CTAP showing appendicitis with appendicolith.     PMH: denies  PSHx: denies  Medications:None  Allergies: NKDA  Social Hx: Denies smoking, alcohol, drugs   Family Hx: Denies family hx of IBS, Crohn's, UC, or colon cancer.             Review of Systems:  Other Review of Systems: All other review of systems negative, except as noted in HPI      Allergies and Intolerances:        Allergies:  	No Known Allergies:     Home Medications:   * Patient Currently Takes Medications as of 06-Jan-2022 07:54 documented in Structured Notes  · 	cephalexin 500 mg oral capsule: 1 cap(s) orally every 6 hours     .    Patient History:    Past Medical, Past Surgical, and Family History:  No pertinent family history of: irritable bowel syndrome.     Social History:  · Substance use	No     Tobacco Screening:  · Core Measure Site	No  · Has the patient used tobacco in the past 30 days?	No    Risk Assessment:    Present on Admission:  Deep Venous Thrombosis	no  Pulmonary Embolus	no     HIV Screening:  · In accordance with [place] State law, we offer every patient who comes to our ED an HIV test. Would you like to be tested today?	Opt out    Physical Exam:   Physical Exam: T(C): 37.8 (09-25-23 @ 14:39), Max: 37.8 (09-25-23 @ 14:39)  HR: 86 (09-25-23 @ 14:39) (86 - 86)  BP: 146/76 (09-25-23 @ 14:39) (146/76 - 146/76)  RR: 16 (09-25-23 @ 14:39) (16 - 16)  SpO2: 96% (09-25-23 @ 14:39) (96% - 96%)    Physical Exam  General: AAOx3, NAD, laying comfortably in bed  Cardio: S1,S2, No MRG  Pulm: Nonlabored breathing  Abdomen: soft, tender in RLQ, voluntary guarding   Extremities: WWP, peripheral pulses appreciated       Labs and Results:  Labs, Radiology, Cardiology, and Other Results: ABS:                        15.1   10.65 )-----------( 192      ( 25 Sep 2023 15:30 )             45.0     09-25    138  |  100  |  11  ----------------------------<  98  4.5   |  30  |  1.04    Ca    9.1      25 Sep 2023 15:30    TPro  7.6  /  Alb  4.6  /  TBili  0.8  /  DBili  x   /  AST  19  /  ALT  14  /  AlkPhos  60  09-25    PT/INR - ( 25 Sep 2023 15:30 )   PT: 15.1 sec;   INR: 1.34          PTT - ( 25 Sep 2023 15:30 )  PTT:30.4 sec    Assessment and Plan:   · Completed VTE Risk Assessment(s)	Surgical Assessment Completed on: 25-Sep-2023 18:30  · Completed VTE Risk Assessment(s)	Refer to the Assessment tab to view/cancel completed assessment.     Assessment:  · Assessment	  28yo Male pt with no PMH, no surgical history, presenting with one day of sharp RLQ pain that started soddenly last night, associated to subjective fevers, chills and nausea, no vomiting. In the ED, pt afebrile, nontachycardic, normotensive, and satting on RA. On exam, tender in RLQ. Labs significant for WBC 10.56 with left shift . CTAP showing Appendix is thickened, measuring up to 17 mm, contains appendicolith and demonstrates mural enhancement and periappendiceal soft tissue infiltration.    NPO   IVF   Ceftriaxone, Flagyl   Added on to OR for tonight   Morning labs   Pain control   Plan discussed with Dr Quesada and chief on call           Attestation Statements:  I have personally seen and examined the patient.  I fully participated in the care of this patient.  I have made amendments to the documentation where necessary, and agree with the history, physical exam, and plan as documented by the Resident.     Patient is a 27 year old gentleman who presents with clinical, laboratory, and radiographic findings consistent with acute appendicitis. He has one day history of sharp and sudden on set right lower quadrant pain with nausea. At time of evaluation afebrile, hemodynamically stable, abdomen soft, tender to palpation in RLQ, nondistended, no rebound or guarding. No previous abdominal surgeries. WBC 11, CT imaging demonstrates significantly thickened and dilated appendix 17 mm with appendicolith. Plan for bowel rest, IVF, IV antibiotics, OR for laparoscopic appendectomy for acute appendicitis.      Time-based billing (NON-critical care).     75 minutes spent on total encounter. The necessity of the time spent during the encounter on this date of service was due to:     evaluation and management of acute appendicitis, review of labs and imaging, discussion with patient regarding benefits and risks of non operative vs. operative management, discussion of laparoscopic appendectomy, documentation.      Electronic Signatures:  Noah Quesada)  (Signed 25-Sep-2023 18:45)  	Authored: Attestation Statements  	Co-Signer: History and Physical, History of Present Illness, Allergies/Medications, Patient History, Risk Assessment, Physical Exam, Labs and Results, Assessment and Plan  Chelsey Kulkarni)  (Signed 25-Sep-2023 18:32)  	Authored: History and Physical, History of Present Illness, Allergies/Medications, Patient History, Risk Assessment, Physical Exam, Labs and Results, Assessment and Plan      Last Updated: 25-Sep-2023 18:45 by Noah Quesada)

## 2023-09-25 NOTE — H&P ADULT - ASSESSMENT
28yo Male pt with no PMH, no surgical history, presenting with one day of sharp RLQ pain that started soddenly last night, associated to subjective fevers, chills and nausea, no vomiting. In the ED, pt afebrile, nontachycardic, normotensive, and satting on RA. On exam, tender in RLQ. Labs significant for WBC 10.56 with left shift . CTAP showing Appendix is thickened, measuring up to 17 mm, contains appendicolith and demonstrates mural enhancement and periappendiceal soft tissue infiltration.    NPO   IVF   Ceftriaxone, Flagyl   Added on to OR for tonight   Morning labs   Pain control   Plan discussed with Dr Quesada and chief on call

## 2023-09-25 NOTE — H&P ADULT - TIME BILLING
evaluation and management of acute appendicitis, review of labs and imaging, discussion with patient regarding benefits and risks of non operative vs. operative management, discussion of laparoscopic appendectomy, documentation

## 2023-09-25 NOTE — BRIEF OPERATIVE NOTE - OPERATION/FINDINGS
Abdomen entered in LUQ via Veress needle and midline Optiview entry. Additional ports placed under laparoscopic visualization. Appendix identifed adherent to abdominal wall/pelvis, released from attachments. Mesoappendix taken with LigaSure. Appendix transected at base using 45mm EndoGIA purple load. Infraumbilical port site closed with 0 Vicryl using EndoClose. Skin closed with Monocryl and Dermabond.

## 2023-09-25 NOTE — PATIENT PROFILE ADULT - FALL HARM RISK - HARM RISK INTERVENTIONS

## 2023-09-26 ENCOUNTER — TRANSCRIPTION ENCOUNTER (OUTPATIENT)
Age: 28
End: 2023-09-26

## 2023-09-26 VITALS
TEMPERATURE: 98 F | SYSTOLIC BLOOD PRESSURE: 115 MMHG | RESPIRATION RATE: 17 BRPM | HEART RATE: 72 BPM | OXYGEN SATURATION: 96 % | DIASTOLIC BLOOD PRESSURE: 56 MMHG

## 2023-09-26 RX ORDER — OXYCODONE HYDROCHLORIDE 5 MG/1
1 TABLET ORAL
Qty: 12 | Refills: 0
Start: 2023-09-26 | End: 2023-09-28

## 2023-09-26 RX ADMIN — HEPARIN SODIUM 5000 UNIT(S): 5000 INJECTION INTRAVENOUS; SUBCUTANEOUS at 06:00

## 2023-09-26 NOTE — DISCHARGE NOTE PROVIDER - NSDCFUADDINST_GEN_ALL_CORE_FT
General Discharge Instructions:  Please resume all regular home medications unless specifically advised not to take a particular medication. Also, please take any new medications as prescribed.  Please get plenty of rest, continue to ambulate several times per day, and drink adequate amounts of fluids. Avoid lifting weights greater than 5-10 lbs until you follow-up with your surgeon, who will instruct you further regarding activity restrictions.  Avoid driving or operating heavy machinery while taking pain medications.  Please follow-up with your surgeon and Primary Care Provider (PCP) as advised.  Incision Care:  *Please call your doctor if you have increased pain, swelling, redness, or drainage from the incision site.  *Avoid swimming and baths until your follow-up appointment.  *You may shower, and wash surgical incisions with a mild soap and warm water. Gently pat the area dry.  Warning Signs:  Please call your doctor if you experience the following:  *You experience new chest pain, pressure, squeezing or tightness.  *New or worsening cough, shortness of breath, or wheeze.  *If you are vomiting and cannot keep down fluids or your medications.  *You are getting dehydrated due to continued vomiting, diarrhea, or other reasons. Signs of dehydration include dry mouth, rapid heartbeat, or feeling dizzy or faint when standing.  *You see blood or dark/black material when you vomit or have a bowel movement.  *You experience burning when you urinate, have blood in your urine, or experience a discharge.  *Your pain is not improving within 8-12 hours or is not gone within 24 hours. Call or return immediately if your pain is getting worse, changes location, or moves to your chest or back.  *You have shaking chills, or fever greater than 101.5 degrees Fahrenheit or 38 degrees Celsius.  *Any change in your symptoms, or any new symptoms that concern you.   Take 2 tabs tylenol every 6 hours as needed for pain management. You have also been prescribed oxycodone for pain not controlled by tylenol. Take 1 tabs as prescribed for severe pain. Take stool softener (colace) to prevent constipation caused by oxycodone    General Discharge Instructions:  Please resume all regular home medications unless specifically advised not to take a particular medication. Also, please take any new medications as prescribed.  Please get plenty of rest, continue to ambulate several times per day, and drink adequate amounts of fluids. Avoid lifting weights greater than 5-10 lbs until you follow-up with your surgeon, who will instruct you further regarding activity restrictions.  Avoid driving or operating heavy machinery while taking pain medications.  Please follow-up with your surgeon and Primary Care Provider (PCP) as advised.  Incision Care:  *Please call your doctor or nurse practitioner if you have increased pain, swelling, redness, or drainage from the incision site.  *Avoid swimming and baths until your follow-up appointment.  *You may shower, and wash surgical incisions with a mild soap and warm water. Gently pat the area dry.  *If you have staples, they will be removed at your follow-up appointment.  *If you have steri-strips, they will fall off on their own. Please remove any remaining strips 7-10 days after surgery.    Warning Signs:  Please call your doctor or nurse practitioner if you experience the following:  *You experience new chest pain, pressure, squeezing or tightness.  *New or worsening cough, shortness of breath, or wheeze.  *If you are vomiting and cannot keep down fluids or your medications.  *You are getting dehydrated due to continued vomiting, diarrhea, or other reasons. Signs of dehydration include dry mouth, rapid heartbeat, or feeling dizzy or faint when standing.  *You see blood or dark/black material when you vomit or have a bowel movement.  *You experience burning when you urinate, have blood in your urine, or experience a discharge.  *Your pain is not improving within 8-12 hours or is not gone within 24 hours. Call or return immediately if your pain is getting worse, changes location, or moves to your chest or back.  *You have shaking chills, or fever greater than 101.5 degrees Fahrenheit or 38 degrees Celsius.  *Any change in your symptoms, or any new symptoms that concern you.

## 2023-09-26 NOTE — DISCHARGE NOTE NURSING/CASE MANAGEMENT/SOCIAL WORK - NSDCPEFALRISK_GEN_ALL_CORE
For information on Fall & Injury Prevention, visit: [email or web address] OR  [email or web address] OR  https://www.cdc.gov/steadi/patient.html

## 2023-09-26 NOTE — DISCHARGE NOTE PROVIDER - CARE PROVIDER_API CALL
Noah Quesada  Surgery  [address], [address]  [place] [address]  Phone: [phone]  Fax: [phone]  Follow Up Time:

## 2023-09-26 NOTE — DISCHARGE NOTE PROVIDER - NSDCMRMEDTOKEN_GEN_ALL_CORE_FT
cephalexin 500 mg oral capsule: 1 cap(s) orally every 6 hours    cephalexin 500 mg oral capsule: 1 cap(s) orally every 6 hours   oxyCODONE 5 mg oral tablet: 1 tab(s) orally every 6 hours as needed for  severe pain MDD: 4   oxyCODONE 5 mg oral tablet: 1 tab(s) orally every 6 hours as needed for  severe pain MDD: 4

## 2023-09-26 NOTE — DISCHARGE NOTE PROVIDER - HOSPITAL COURSE
26yo Male pt with no PMH, no surgical history, presenting with RLQ abdominal pain. CTAP showing appendicitis with appendicolith. He underwent a laparoscopic appendectomy on 9/25. Patient's post-operative course was uncomplicated. Diet was advanced as tolerated and pain was well controlled on medication. On day of discharge, pt deemed stable and ready to return home with plan to follow up as an outpatient.

## 2023-09-26 NOTE — PROGRESS NOTE ADULT - SUBJECTIVE AND OBJECTIVE BOX
General Surgery Post op Check    Pt seen and examined without complaints. Pain is controlled, patient describes it as a "3/10". Denies SOB/CP/N/V.     Vital Signs Last 24 Hrs  T(C): 37.2 (26 Sep 2023 00:27), Max: 37.8 (25 Sep 2023 14:39)  T(F): 98.9 (26 Sep 2023 00:27), Max: 100 (25 Sep 2023 14:39)  HR: 82 (26 Sep 2023 00:27) (70 - 86)  BP: 127/77 (26 Sep 2023 00:27) (119/73 - 146/76)  BP(mean): 93 (26 Sep 2023 00:27) (93 - 104)  RR: 17 (26 Sep 2023 00:27) (15 - 17)  SpO2: 96% (26 Sep 2023 00:27) (96% - 100%)    Parameters below as of 26 Sep 2023 00:27  Patient On (Oxygen Delivery Method): room air        I&O's Summary    25 Sep 2023 07:01  -  26 Sep 2023 02:33  --------------------------------------------------------  IN: 0 mL / OUT: 1250 mL / NET: -1250 mL        Physical Exam  Gen: NAD, A&Ox3  Pulm: No respiratory distress, no subcostal retractions  CV: RRR, no JVD  Abd: Soft, NT, ND  Extremities:  FROM, warm and well perfused, equal bilateral muscle strength      A/P: 28yo Male pt with no PMH, no surgical history, presenting with sharp RLQ pain suspicious for appendicitis now s/p Lap appy 9/25.  Plan:  Reg diet   Pain/Nausea PRN   SQH/SCDs  IS

## 2023-09-26 NOTE — DISCHARGE NOTE NURSING/CASE MANAGEMENT/SOCIAL WORK - NSDCVIVACCINE_GEN_ALL_CORE_FT
Tdap; 06-Jan-2022 07:36; Elie Newsome (RN); [employer]; [id number] (Exp. Date: 09-Sep-2023); IntraMuscular; Deltoid Left.; 0.5 milliLiter(s); VIS (VIS Published: 09-May-2013, VIS Presented: 06-Jan-2022);

## 2023-09-26 NOTE — DISCHARGE NOTE NURSING/CASE MANAGEMENT/SOCIAL WORK - PATIENT PORTAL LINK FT
You can access the FollowMyHealth Patient Portal offered by [facility] by registering at the following website: [email or web address]. By joining [email or web address]’s FollowMyHealth portal, you will also be able to view your health information using other applications (apps) compatible with our system.

## 2023-09-28 DIAGNOSIS — K35.80 UNSPECIFIED ACUTE APPENDICITIS: ICD-10-CM

## 2023-09-28 DIAGNOSIS — K35.890 OTHER ACUTE APPENDICITIS WITHOUT PERFORATION OR GANGRENE: ICD-10-CM

## 2023-10-02 ENCOUNTER — APPOINTMENT (OUTPATIENT)
Dept: BARIATRICS | Facility: CLINIC | Age: 28
End: 2023-10-02
Payer: COMMERCIAL

## 2023-10-02 VITALS
HEIGHT: 67 IN | OXYGEN SATURATION: 99 % | DIASTOLIC BLOOD PRESSURE: 80 MMHG | TEMPERATURE: 97.6 F | WEIGHT: 161 LBS | BODY MASS INDEX: 25.27 KG/M2 | SYSTOLIC BLOOD PRESSURE: 127 MMHG | HEART RATE: 76 BPM

## 2023-10-02 DIAGNOSIS — Z90.49 ACQUIRED ABSENCE OF OTHER SPECIFIED PARTS OF DIGESTIVE TRACT: ICD-10-CM

## 2023-10-02 PROCEDURE — 99024 POSTOP FOLLOW-UP VISIT: CPT

## 2023-10-04 LAB — SURGICAL PATHOLOGY STUDY: SIGNIFICANT CHANGE UP

## 2023-10-08 PROCEDURE — 81003 URINALYSIS AUTO W/O SCOPE: CPT

## 2023-10-08 PROCEDURE — 85025 COMPLETE CBC W/AUTO DIFF WBC: CPT

## 2023-10-08 PROCEDURE — G1004: CPT

## 2023-10-08 PROCEDURE — 88304 TISSUE EXAM BY PATHOLOGIST: CPT

## 2023-10-08 PROCEDURE — C1889: CPT

## 2023-10-08 PROCEDURE — 80053 COMPREHEN METABOLIC PANEL: CPT

## 2023-10-08 PROCEDURE — 36415 COLL VENOUS BLD VENIPUNCTURE: CPT

## 2023-10-08 PROCEDURE — 83690 ASSAY OF LIPASE: CPT

## 2023-10-08 PROCEDURE — 74177 CT ABD & PELVIS W/CONTRAST: CPT | Mod: MG

## 2023-10-08 PROCEDURE — 99285 EMERGENCY DEPT VISIT HI MDM: CPT

## 2023-10-08 PROCEDURE — 85730 THROMBOPLASTIN TIME PARTIAL: CPT

## 2023-10-08 PROCEDURE — 85610 PROTHROMBIN TIME: CPT

## 2024-11-27 ENCOUNTER — APPOINTMENT (OUTPATIENT)
Dept: INTERNAL MEDICINE | Facility: CLINIC | Age: 29
End: 2024-11-27
Payer: COMMERCIAL

## 2024-11-27 ENCOUNTER — LABORATORY RESULT (OUTPATIENT)
Age: 29
End: 2024-11-27

## 2024-11-27 ENCOUNTER — NON-APPOINTMENT (OUTPATIENT)
Age: 29
End: 2024-11-27

## 2024-11-27 VITALS
HEIGHT: 67 IN | SYSTOLIC BLOOD PRESSURE: 145 MMHG | HEART RATE: 91 BPM | BODY MASS INDEX: 25.27 KG/M2 | OXYGEN SATURATION: 98 % | DIASTOLIC BLOOD PRESSURE: 80 MMHG | WEIGHT: 161 LBS

## 2024-11-27 DIAGNOSIS — R22.9 LOCALIZED SWELLING, MASS AND LUMP, UNSPECIFIED: ICD-10-CM

## 2024-11-27 DIAGNOSIS — Z00.00 ENCOUNTER FOR GENERAL ADULT MEDICAL EXAMINATION W/OUT ABNORMAL FINDINGS: ICD-10-CM

## 2024-11-27 DIAGNOSIS — Z11.3 ENCOUNTER FOR SCREENING FOR INFECTIONS WITH A PREDOMINANTLY SEXUAL MODE OF TRANSMISSION: ICD-10-CM

## 2024-11-27 PROCEDURE — 99385 PREV VISIT NEW AGE 18-39: CPT

## 2024-11-27 PROCEDURE — 36415 COLL VENOUS BLD VENIPUNCTURE: CPT

## 2024-11-27 PROCEDURE — G0444 DEPRESSION SCREEN ANNUAL: CPT | Mod: 59

## 2024-11-27 PROCEDURE — 99205 OFFICE O/P NEW HI 60 MIN: CPT | Mod: 25

## 2024-12-02 LAB
ALBUMIN SERPL ELPH-MCNC: 4.9 G/DL
ALP BLD-CCNC: 59 U/L
ALT SERPL-CCNC: 16 U/L
ANION GAP SERPL CALC-SCNC: 11 MMOL/L
APPEARANCE: CLEAR
AST SERPL-CCNC: 17 U/L
BACTERIA: NEGATIVE /HPF
BASOPHILS # BLD AUTO: 0.03 K/UL
BASOPHILS NFR BLD AUTO: 0.5 %
BILIRUB SERPL-MCNC: 0.5 MG/DL
BILIRUBIN URINE: NEGATIVE
BLOOD URINE: NEGATIVE
BUN SERPL-MCNC: 14 MG/DL
C TRACH RRNA SPEC QL NAA+PROBE: NOT DETECTED
CALCIUM SERPL-MCNC: 9.8 MG/DL
CAST: 2 /LPF
CHLORIDE SERPL-SCNC: 103 MMOL/L
CHOLEST SERPL-MCNC: 145 MG/DL
CO2 SERPL-SCNC: 27 MMOL/L
COLOR: YELLOW
CREAT SERPL-MCNC: 1.1 MG/DL
EGFR: 93 ML/MIN/1.73M2
EOSINOPHIL # BLD AUTO: 0.09 K/UL
EOSINOPHIL NFR BLD AUTO: 1.6 %
EPITHELIAL CELLS: 0 /HPF
ESTIMATED AVERAGE GLUCOSE: 85 MG/DL
GLUCOSE QUALITATIVE U: NEGATIVE MG/DL
GLUCOSE SERPL-MCNC: 102 MG/DL
HAV IGM SER QL: NONREACTIVE
HBA1C MFR BLD HPLC: 4.6 %
HBV CORE IGM SER QL: NONREACTIVE
HBV SURFACE AG SER QL: NONREACTIVE
HCT VFR BLD CALC: 44.8 %
HCV AB SER QL: NONREACTIVE
HCV S/CO RATIO: 0.17 S/CO
HDLC SERPL-MCNC: 53 MG/DL
HGB BLD-MCNC: 14.4 G/DL
IMM GRANULOCYTES NFR BLD AUTO: 0.2 %
KETONES URINE: NEGATIVE MG/DL
LDLC SERPL CALC-MCNC: 73 MG/DL
LEUKOCYTE ESTERASE URINE: NEGATIVE
LYMPHOCYTES # BLD AUTO: 1.7 K/UL
LYMPHOCYTES NFR BLD AUTO: 31.1 %
MAN DIFF?: NORMAL
MCHC RBC-ENTMCNC: 30.3 PG
MCHC RBC-ENTMCNC: 32.1 G/DL
MCV RBC AUTO: 94.3 FL
MICROSCOPIC-UA: NORMAL
MONOCYTES # BLD AUTO: 0.67 K/UL
MONOCYTES NFR BLD AUTO: 12.3 %
N GONORRHOEA RRNA SPEC QL NAA+PROBE: NOT DETECTED
NEUTROPHILS # BLD AUTO: 2.96 K/UL
NEUTROPHILS NFR BLD AUTO: 54.3 %
NITRITE URINE: NEGATIVE
NONHDLC SERPL-MCNC: 92 MG/DL
PH URINE: 7
PLATELET # BLD AUTO: 218 K/UL
POTASSIUM SERPL-SCNC: 4.4 MMOL/L
PROT SERPL-MCNC: 7.2 G/DL
PROTEIN URINE: NEGATIVE MG/DL
RBC # BLD: 4.75 M/UL
RBC # FLD: 11.7 %
RED BLOOD CELLS URINE: 0 /HPF
SODIUM SERPL-SCNC: 142 MMOL/L
SOURCE AMPLIFICATION: NORMAL
SPECIFIC GRAVITY URINE: 1.02
T PALLIDUM AB SER QL IA: NEGATIVE
TRIGL SERPL-MCNC: 102 MG/DL
UROBILINOGEN URINE: 1 MG/DL
WBC # FLD AUTO: 5.46 K/UL
WHITE BLOOD CELLS URINE: 0 /HPF

## 2024-12-03 ENCOUNTER — NON-APPOINTMENT (OUTPATIENT)
Age: 29
End: 2024-12-03

## 2025-01-29 NOTE — ED ADULT NURSE NOTE - NS ED NURSE RECORD ANOTHER VITAL SIGN
Refill Request - Controlled Substance    CONFIRM preferred pharmacy with the patient.    If Mail Order Rx - Pend for 90 day refill.        Last Seen Department: 12/3/2024  Last Seen by PCP: 12/3/2024    Last Written: 12/30/2024 30 tab 0 refills     Last UDS: 06/03/2024    Med Agreement Signed On: NA    If no future appointment scheduled:  Review the last OV with PCP and review information for follow-up visit,  Route STAFF MESSAGE with patient name to the  Pool for scheduling with the following information:            -  Timing of next visit           -  Visit type ie Physical, OV, etc           -  Diagnoses/Reason ie. COPD, HTN - Do not use MEDICATION, Follow-up or CHECK UP - Give reason for visit        Next Appointment:   Future Appointments   Date Time Provider Department Center   6/3/2025 10:00 AM Haylie Wooten PA [facility]   6/18/2025  1:00 PM Sumanth Alvarez APRN - CNP [facility]       Message sent to  to schedule appt with patient?  NO      Requested Prescriptions     Pending Prescriptions Disp Refills    zolpidem (AMBIEN) 10 MG tablet 30 tablet 0     Sig: Take 1 tablet by mouth nightly as needed for Sleep for up to 30 days. Max Daily Amount: 10 mg         
Yes

## (undated) DEVICE — SHEARS HARMONIC ACE 5MM X 36CM CURVED TIP

## (undated) DEVICE — WARMING BLANKET UPPER ADULT

## (undated) DEVICE — D HELP - CLEARVIEW CLEARIFY SYSTEM

## (undated) DEVICE — ENDOCATCH 10MM SPECIMEN POUCH

## (undated) DEVICE — ELCTR BOVIE PENCIL BLADE 10FT

## (undated) DEVICE — Device

## (undated) DEVICE — DRAPE 1/2 SHEET 40X57"

## (undated) DEVICE — TROCAR COVIDIEN VERSAONE BLUNT TIP HASSAN 12MM

## (undated) DEVICE — SOL IRR BAG NS 0.9% 3000ML

## (undated) DEVICE — POSITIONER FOAM EGG CRATE ULNAR 2PCS (PINK)

## (undated) DEVICE — GLV 7.5 PROTEXIS (WHITE)

## (undated) DEVICE — TROCAR COVIDIEN VERSAPORT BLADELESS OPTICAL 5MM STANDARD

## (undated) DEVICE — TROCAR COVIDIEN VERSAPORT BLADELESS OPTICAL 12MM STANDARD

## (undated) DEVICE — TIP METZENBAUM SCISSOR MONOPOLAR ENDOCUT (ORANGE)

## (undated) DEVICE — BLADE SURGICAL #15 CARBON

## (undated) DEVICE — SUT VICRYL 0 27" UR-6

## (undated) DEVICE — TUBING STRYKER PNEUMOCLEAR HIGH FLOW

## (undated) DEVICE — TROCAR COVIDIEN VERSAONE FIXATION CANNULA 5MM

## (undated) DEVICE — PACK GENERAL LAPAROSCOPY

## (undated) DEVICE — STAPLER COVIDIEN ENDO GIA STANDARD HANDLE

## (undated) DEVICE — DRAPE 3/4 SHEET 52X76"

## (undated) DEVICE — SUT MONOCRYL 4-0 18" PS-2

## (undated) DEVICE — DRSG DERMABOND 0.7ML

## (undated) DEVICE — VENODYNE/SCD SLEEVE CALF MEDIUM